# Patient Record
Sex: MALE | Race: BLACK OR AFRICAN AMERICAN | Employment: FULL TIME | URBAN - METROPOLITAN AREA
[De-identification: names, ages, dates, MRNs, and addresses within clinical notes are randomized per-mention and may not be internally consistent; named-entity substitution may affect disease eponyms.]

---

## 2020-10-02 ENCOUNTER — OFFICE VISIT (OUTPATIENT)
Dept: INTERNAL MEDICINE CLINIC | Age: 35
End: 2020-10-02
Payer: MEDICAID

## 2020-10-02 VITALS
SYSTOLIC BLOOD PRESSURE: 152 MMHG | WEIGHT: 260.6 LBS | BODY MASS INDEX: 40.9 KG/M2 | OXYGEN SATURATION: 98 % | RESPIRATION RATE: 18 BRPM | HEIGHT: 67 IN | HEART RATE: 72 BPM | TEMPERATURE: 97.8 F | DIASTOLIC BLOOD PRESSURE: 84 MMHG

## 2020-10-02 DIAGNOSIS — E66.01 MORBID OBESITY (HCC): ICD-10-CM

## 2020-10-02 DIAGNOSIS — Z13.220 SCREENING FOR CHOLESTEROL LEVEL: ICD-10-CM

## 2020-10-02 DIAGNOSIS — Z82.49 FAMILY HISTORY OF HYPERTENSION: ICD-10-CM

## 2020-10-02 DIAGNOSIS — I10 ESSENTIAL HYPERTENSION: Primary | ICD-10-CM

## 2020-10-02 DIAGNOSIS — F17.290 CIGAR SMOKER: ICD-10-CM

## 2020-10-02 DIAGNOSIS — R81 GLYCOSURIA: ICD-10-CM

## 2020-10-02 PROBLEM — F17.210 CIGARETTE SMOKER: Status: ACTIVE | Noted: 2020-10-02

## 2020-10-02 PROCEDURE — 99214 OFFICE O/P EST MOD 30 MIN: CPT | Performed by: INTERNAL MEDICINE

## 2020-10-02 RX ORDER — MELOXICAM 15 MG/1
15 TABLET ORAL DAILY
COMMUNITY
End: 2020-10-02 | Stop reason: SDDI

## 2020-10-02 RX ORDER — LOSARTAN POTASSIUM AND HYDROCHLOROTHIAZIDE 12.5; 5 MG/1; MG/1
1 TABLET ORAL DAILY
COMMUNITY
End: 2020-10-02 | Stop reason: SDDI

## 2020-10-02 RX ORDER — LOSARTAN POTASSIUM AND HYDROCHLOROTHIAZIDE 12.5; 5 MG/1; MG/1
1 TABLET ORAL
Qty: 30 TAB | Refills: 3 | Status: SHIPPED | OUTPATIENT
Start: 2020-10-02

## 2020-10-02 NOTE — PROGRESS NOTES
Jeni Singh is a 30 y.o. 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 By taking detailed history he smokes about 2 cigars/day and he had taken excessive alcohol drink on weekend before DOT but he does not drink on daily basis. 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 Today his blood pressure is elevated he has stopped taking losartan hydrochlorothiazide since long he has not done any recent labs. He has a family history of hypertension. He does not want to use nicotine patch for now or any medications to quit smoking cigar. His BMI is 40.8. He has no headache or dizziness. No chest pain or shortness of breath. He also told that during DOT he had glucose in the urine he was told, to cut back sugar and starch and to see PCP for elevated blood pressure readings,      Review of Systems    Review of Systems   Constitutional: Negative. HENT: Negative for sinus pain. Eyes: Negative for blurred vision. Respiratory: Negative for cough, shortness of breath and wheezing. Cardiovascular: Negative. Gastrointestinal: Negative. Genitourinary: Negative. Musculoskeletal: Negative. Neurological: Negative for dizziness, tingling, tremors, sensory change and headaches. Endo/Heme/Allergies: Negative for polydipsia. Psychiatric/Behavioral: Negative for depression. The patient is not nervous/anxious. Past Medical History:   Diagnosis Date    Cigarette smoker 10/2/2020    Essential hypertension 10/2/2020    Family history of hypertension 10/2/2020    Morbid obesity ([facility] [address] 10/2/2020     No past surgical history on file.   Social History     Socioeconomic History    Marital status: SINGLE     Spouse name: Not on file    Number of children: Not on file    Years of education: Not on file    Highest education level: Not on file   Tobacco Use    Smoking status: Current Every Day Smoker    Smokeless tobacco: Never Used    Tobacco comment: ONLY SMOKING CIGARS NO CIGARETTES  2 ppd   Substance and Sexual Activity    Alcohol use: Yes     Comment: occasionally    Drug use: Never     Family History   Problem Relation Age of Onset    Hypertension Mother      Current Outpatient Medications   Medication Sig Dispense Refill    losartan-hydroCHLOROthiazide (HYZAAR) 50-12.5 mg per tablet Take 1 Tab by mouth every morning. Indications: high blood pressure 30 Tab 3     No Known Allergies    Objective:  Visit Vitals  BP (!) 152/84 (BP 1 Location: Left arm, BP Patient Position: Sitting)   Pulse 72   Temp 97.8 °F (36.6 °C) (Temporal)   Resp 18   Ht 5' 7\" (1.702 m)   Wt 260 lb 9.6 oz (118.2 kg)   SpO2 98%   BMI 40.82 kg/m²       Physical Exam:   Constitutional: General Appearance: Morbid obese. Level of Distress: NAD. Psychiatric: Mental Status: normal mood and affect Orientation: to time, place, and person. ,normal eye contact. Head: Head: normocephalic and atraumatic. Eyes: Pupils: PERRLA. Sclerae: non-icteric. Neck: Neck: supple, trachea midline, and no masses. Lymph Nodes: no cervical LAD. Thyroid: no enlargement or nodules and non-tender. Lungs: Respiratory effort: no dyspnea. Auscultation: no wheezing, rales/crackles, or rhonchi and breath sounds normal and good air movement. Cardiovascular: Apical Impulse: not displaced. Heart Auscultation: normal S1 and S2; no murmurs, rubs, or gallops; and RRR. Neck vessels: no carotid bruits. Pulses including femoral / pedal: normal throughout. Abdomen: Bowel Sounds: normal. Inspection and Palpation: no tenderness, guarding, or masses and soft and non-distended. Liver: non-tender and no hepatomegaly. Spleen: non-tender and no splenomegaly. Musculoskeletal[de-identified] Extremities: no edema,no varicosities. No Calf tenderness. Neurologic: Gait and Station: normal gait and station. Motor Strength normal right and left. Skin: Inspection and palpation: no rash, lesions, or ulcer. No results found for this or any previous visit. Assessment/Plan:    ICD-10-CM ICD-9-CM    1. Essential hypertension  I10 401.9 CBC WITH AUTOMATED DIFF      METABOLIC PANEL, COMPREHENSIVE      TSH 3RD GENERATION   2. Morbid obesity ([address]  E66.01 278.01 HEMOGLOBIN A1C WITH EAG   3. Glycosuria  R81 791.5 HEMOGLOBIN A1C WITH EAG      URINALYSIS W/ RFLX MICROSCOPIC   4. Screening for cholesterol level  Z13.220 V77.91    5. Family history of hypertension  Z82.49 V17.49    6. Cigar smoker  F17.290 305.1      Orders Placed This Encounter    CBC WITH AUTOMATED DIFF    METABOLIC PANEL, COMPREHENSIVE    HEMOGLOBIN A1C WITH EAG    URINALYSIS W/ RFLX MICROSCOPIC    TSH 3RD GENERATION    losartan-hydroCHLOROthiazide (HYZAAR) 50-12.5 mg per tablet     Sig: Take 1 Tab by mouth every morning. Indications: high blood pressure     Dispense:  30 Tab     Refill:  3     Hypertension uncontrolled due to not taking medicines he is made aware with the seriousness, and complications from uncontrolled blood sugar leading to, congestive heart failure coronary artery disease, stroke, eye damage, kidney failure and other complications, strongly recommended not to drink excessive, he is not drinking every day but on weekends before doing DOT he had taken excessive amount of liquor, and he is smoking 2 cigars/day, he is eating a lot of fast food so, I had given education and counseling regarding healthy eating habits, and meal planning, and cut back on sodium starch sugar and fatty food in the diet he is drinking a lot of sugar-containing beverages and regular soda and juice now he has started drinking water,. Smoking cessation counseling given.   Started on losartan hydrochlorothiazide 50/12.5 mg/day he has a family history of hypertension,. Labs ordered. .  Side effects of losartan hydrochlorothiazide explained. In the DOT he had glucose in the urine since he has morbid obesity and glycosuria, and in the past he had, elevated glucose in his labs I ordered hemoglobin A1c.,    I spent at least 5 minutes in smoking cessation counseling . Complications from smoking explained including, but not limited to CANCER of mouth, tongue, throat (Larynx), LUNG, esophagus, stomach, bladder among others, COPD, emphysema, asthma, damage to blood vessels which can affect circulation to eyes, kidneys, fingers, toes AND blood flow to vital organs,  causing  heart disease, stroke and other complications. Also increased risk of blood pressure, palpitations, chronic sinus problem, decreased taste and smell. Discussed available methods that help with quitting, as well as evidence available for each method to quit smoking. Morbid obesity with BMI 40.8 diet less than 1400-calorie diet plan, and exercise minimum 30 minutes 5 days a week,. Side effects of losartan hydrochlorothiazide explained    lose weight, increase physical activity, limit alcohol consumption, follow low fat diet, follow low salt diet, continue present plan, routine labs ordered, call if any problems. I spent total 25 minutes with him and more than 50% of time was spent in giving education and compliance and, prevention of complications from uncontrolled hypertension and what are the complications from uncontrolled hypertension sugar and, obesity explained in length. There are no Patient Instructions on file for this visit. Follow-up and Dispositions    · Return in about 3 months (around 1/2/2021) for htn ,fasting labs bow ,handouts.

## 2020-10-03 LAB
ALBUMIN SERPL-MCNC: 4.5 G/DL (ref 4–5)
ALBUMIN/GLOB SERPL: 1.8 {RATIO} (ref 1.2–2.2)
ALP SERPL-CCNC: 113 IU/L (ref 39–117)
ALT SERPL-CCNC: 16 IU/L (ref 0–44)
APPEARANCE UR: CLEAR
AST SERPL-CCNC: 14 IU/L (ref 0–40)
BASOPHILS # BLD AUTO: 0 X10E3/UL (ref 0–0.2)
BASOPHILS NFR BLD AUTO: 1 %
BILIRUB SERPL-MCNC: 0.7 MG/DL (ref 0–1.2)
BILIRUB UR QL STRIP: NEGATIVE
BUN SERPL-MCNC: 10 MG/DL (ref 6–20)
BUN/CREAT SERPL: 11 (ref 9–20)
CALCIUM SERPL-MCNC: 9.5 MG/DL (ref 8.7–10.2)
CHLORIDE SERPL-SCNC: 101 MMOL/L (ref 96–106)
CO2 SERPL-SCNC: 23 MMOL/L (ref 20–29)
COLOR UR: YELLOW
CREAT SERPL-MCNC: 0.87 MG/DL (ref 0.76–1.27)
EOSINOPHIL # BLD AUTO: 0.1 X10E3/UL (ref 0–0.4)
EOSINOPHIL NFR BLD AUTO: 1 %
ERYTHROCYTE [DISTWIDTH] IN BLOOD BY AUTOMATED COUNT: 14 % (ref 11.6–15.4)
EST. AVERAGE GLUCOSE BLD GHB EST-MCNC: 123 MG/DL
GLOBULIN SER CALC-MCNC: 2.5 G/DL (ref 1.5–4.5)
GLUCOSE SERPL-MCNC: 102 MG/DL (ref 65–99)
GLUCOSE UR QL: NEGATIVE
HBA1C MFR BLD: 5.9 % (ref 4.8–5.6)
HCT VFR BLD AUTO: 43.6 % (ref 37.5–51)
HGB BLD-MCNC: 14.3 G/DL (ref 13–17.7)
HGB UR QL STRIP: NEGATIVE
IMM GRANULOCYTES # BLD AUTO: 0 X10E3/UL (ref 0–0.1)
IMM GRANULOCYTES NFR BLD AUTO: 0 %
KETONES UR QL STRIP: ABNORMAL
LEUKOCYTE ESTERASE UR QL STRIP: NEGATIVE
LYMPHOCYTES # BLD AUTO: 1.8 X10E3/UL (ref 0.7–3.1)
LYMPHOCYTES NFR BLD AUTO: 31 %
MCH RBC QN AUTO: 27.9 PG (ref 26.6–33)
MCHC RBC AUTO-ENTMCNC: 32.8 G/DL (ref 31.5–35.7)
MCV RBC AUTO: 85 FL (ref 79–97)
MICRO URNS: ABNORMAL
MONOCYTES # BLD AUTO: 0.5 X10E3/UL (ref 0.1–0.9)
MONOCYTES NFR BLD AUTO: 9 %
NEUTROPHILS # BLD AUTO: 3.4 X10E3/UL (ref 1.4–7)
NEUTROPHILS NFR BLD AUTO: 58 %
NITRITE UR QL STRIP: NEGATIVE
PH UR STRIP: 5.5 [PH] (ref 5–7.5)
PLATELET # BLD AUTO: 319 X10E3/UL (ref 150–450)
POTASSIUM SERPL-SCNC: 4 MMOL/L (ref 3.5–5.2)
PROT SERPL-MCNC: 7 G/DL (ref 6–8.5)
PROT UR QL STRIP: NEGATIVE
RBC # BLD AUTO: 5.13 X10E6/UL (ref 4.14–5.8)
SODIUM SERPL-SCNC: 137 MMOL/L (ref 134–144)
SP GR UR: 1.03 (ref 1–1.03)
TSH SERPL DL<=0.005 MIU/L-ACNC: 1.57 UIU/ML (ref 0.45–4.5)
UROBILINOGEN UR STRIP-MCNC: 1 MG/DL (ref 0.2–1)
WBC # BLD AUTO: 5.8 X10E3/UL (ref 3.4–10.8)

## 2020-10-04 NOTE — PROGRESS NOTES
Please call him that  his labs are good that does not mean, he should keep taking sugar-containing beverages, he is in, prediabetic state by definition with hemoglobin A1c however he should start exercise and healthy diet,, and less  sugar starch and fat

## 2020-10-06 ENCOUNTER — TELEPHONE (OUTPATIENT)
Dept: INTERNAL MEDICINE CLINIC | Age: 35
End: 2020-10-06

## 2020-10-10 ENCOUNTER — TELEPHONE (OUTPATIENT)
Dept: INTERNAL MEDICINE CLINIC | Age: 35
End: 2020-10-10

## 2020-10-12 ENCOUNTER — TELEPHONE (OUTPATIENT)
Dept: INTERNAL MEDICINE CLINIC | Age: 35
End: 2020-10-12

## 2020-10-12 NOTE — TELEPHONE ENCOUNTER
Pt. Returned our phone call , per Dr. Heather Bosch, labs look good, but cut back on sugars and starches, results show that you are  prediabetic, exercise 30 min. At least 5 days a week.  Pt. agreed

## 2020-11-01 PROBLEM — Z13.220 SCREENING FOR CHOLESTEROL LEVEL: Status: RESOLVED | Noted: 2020-10-02 | Resolved: 2020-11-01

## 2021-01-04 ENCOUNTER — TELEPHONE (OUTPATIENT)
Dept: INTERNAL MEDICINE CLINIC | Age: 36
End: 2021-01-04

## 2022-03-18 PROBLEM — Z82.49 FAMILY HISTORY OF HYPERTENSION: Status: ACTIVE | Noted: 2020-10-02

## 2022-03-18 PROBLEM — E66.01 MORBID OBESITY (HCC): Status: ACTIVE | Noted: 2020-10-02

## 2022-03-19 PROBLEM — F17.210 CIGARETTE SMOKER: Status: ACTIVE | Noted: 2020-10-02

## 2022-03-19 PROBLEM — F17.290 CIGAR SMOKER: Status: ACTIVE | Noted: 2020-10-02

## 2022-03-19 PROBLEM — I10 ESSENTIAL HYPERTENSION: Status: ACTIVE | Noted: 2020-10-02

## 2022-03-20 PROBLEM — R81 GLYCOSURIA: Status: ACTIVE | Noted: 2020-10-02

## 2023-05-17 RX ORDER — LOSARTAN POTASSIUM AND HYDROCHLOROTHIAZIDE 12.5; 5 MG/1; MG/1
1 TABLET ORAL
COMMUNITY
Start: 2020-10-02